# Patient Record
Sex: MALE | Race: WHITE | NOT HISPANIC OR LATINO | Employment: OTHER | URBAN - NONMETROPOLITAN AREA
[De-identification: names, ages, dates, MRNs, and addresses within clinical notes are randomized per-mention and may not be internally consistent; named-entity substitution may affect disease eponyms.]

---

## 2017-01-31 RX ORDER — BENAZEPRIL HYDROCHLORIDE 20 MG/1
TABLET ORAL
Qty: 60 TABLET | Refills: 0 | Status: SHIPPED | OUTPATIENT
Start: 2017-01-31 | End: 2017-03-02 | Stop reason: SDUPTHER

## 2017-02-07 ENCOUNTER — TRANSCRIBE ORDERS (OUTPATIENT)
Dept: LAB | Facility: OTHER | Age: 82
End: 2017-02-07

## 2017-02-07 ENCOUNTER — LAB (OUTPATIENT)
Dept: LAB | Facility: OTHER | Age: 82
End: 2017-02-07

## 2017-02-07 DIAGNOSIS — N18.9 CHRONIC KIDNEY DISEASE, UNSPECIFIED: Primary | ICD-10-CM

## 2017-02-07 DIAGNOSIS — N18.9 CHRONIC KIDNEY DISEASE, UNSPECIFIED: ICD-10-CM

## 2017-02-07 DIAGNOSIS — E87.5 HYPERKALEMIA: ICD-10-CM

## 2017-02-07 LAB
ANION GAP SERPL CALCULATED.3IONS-SCNC: 9 MMOL/L (ref 5–15)
BUN BLD-MCNC: 35 MG/DL (ref 8–25)
BUN/CREAT SERPL: 21.9 (ref 7–25)
CALCIUM SPEC-SCNC: 9.5 MG/DL (ref 8.4–10.8)
CHLORIDE SERPL-SCNC: 106 MMOL/L (ref 100–112)
CO2 SERPL-SCNC: 25 MMOL/L (ref 20–32)
CREAT BLD-MCNC: 1.6 MG/DL (ref 0.4–1.3)
GFR SERPL CREATININE-BSD FRML MDRD: 42 ML/MIN/1.73 (ref 42–98)
GLUCOSE BLD-MCNC: 120 MG/DL (ref 70–100)
POTASSIUM BLD-SCNC: 5.2 MMOL/L (ref 3.4–5.4)
SODIUM BLD-SCNC: 140 MMOL/L (ref 134–146)

## 2017-02-07 PROCEDURE — 80048 BASIC METABOLIC PNL TOTAL CA: CPT | Performed by: INTERNAL MEDICINE

## 2017-02-07 PROCEDURE — 36415 COLL VENOUS BLD VENIPUNCTURE: CPT | Performed by: INTERNAL MEDICINE

## 2017-02-28 ENCOUNTER — LAB (OUTPATIENT)
Dept: LAB | Facility: OTHER | Age: 82
End: 2017-02-28

## 2017-02-28 DIAGNOSIS — R73.9 HYPERGLYCEMIA: ICD-10-CM

## 2017-02-28 DIAGNOSIS — I10 ESSENTIAL HYPERTENSION: ICD-10-CM

## 2017-02-28 DIAGNOSIS — E78.5 HYPERLIPIDEMIA: ICD-10-CM

## 2017-02-28 LAB
ALBUMIN SERPL-MCNC: 3.6 G/DL (ref 3.2–5.5)
ALBUMIN/GLOB SERPL: 1 G/DL (ref 1–3)
ALP SERPL-CCNC: 84 U/L (ref 15–121)
ALT SERPL W P-5'-P-CCNC: 23 U/L (ref 10–60)
ANION GAP SERPL CALCULATED.3IONS-SCNC: 9 MMOL/L (ref 5–15)
ARTICHOKE IGE QN: 73 MG/DL (ref 0–129)
AST SERPL-CCNC: 27 U/L (ref 10–60)
BILIRUB SERPL-MCNC: 0.9 MG/DL (ref 0.2–1)
BUN BLD-MCNC: 23 MG/DL (ref 8–25)
BUN/CREAT SERPL: 23 (ref 7–25)
CALCIUM SPEC-SCNC: 9.3 MG/DL (ref 8.4–10.8)
CHLORIDE SERPL-SCNC: 108 MMOL/L (ref 100–112)
CO2 SERPL-SCNC: 27 MMOL/L (ref 20–32)
CREAT BLD-MCNC: 1 MG/DL (ref 0.4–1.3)
GFR SERPL CREATININE-BSD FRML MDRD: 72 ML/MIN/1.73 (ref 42–98)
GLOBULIN UR ELPH-MCNC: 3.7 GM/DL (ref 2.5–4.6)
GLUCOSE BLD-MCNC: 124 MG/DL (ref 70–100)
POTASSIUM BLD-SCNC: 3.9 MMOL/L (ref 3.4–5.4)
PROT SERPL-MCNC: 7.3 G/DL (ref 6.7–8.2)
SODIUM BLD-SCNC: 144 MMOL/L (ref 134–146)

## 2017-02-28 PROCEDURE — 36415 COLL VENOUS BLD VENIPUNCTURE: CPT | Performed by: FAMILY MEDICINE

## 2017-02-28 PROCEDURE — 83036 HEMOGLOBIN GLYCOSYLATED A1C: CPT | Performed by: FAMILY MEDICINE

## 2017-02-28 PROCEDURE — 83721 ASSAY OF BLOOD LIPOPROTEIN: CPT | Performed by: FAMILY MEDICINE

## 2017-02-28 PROCEDURE — 80053 COMPREHEN METABOLIC PANEL: CPT | Performed by: FAMILY MEDICINE

## 2017-03-01 LAB — HBA1C MFR BLD: 5.59 % (ref 4–5.6)

## 2017-03-02 RX ORDER — BENAZEPRIL HYDROCHLORIDE 20 MG/1
TABLET ORAL
Qty: 60 TABLET | Refills: 0 | Status: SHIPPED | OUTPATIENT
Start: 2017-03-02 | End: 2017-04-02 | Stop reason: SDUPTHER

## 2017-03-09 ENCOUNTER — OFFICE VISIT (OUTPATIENT)
Dept: FAMILY MEDICINE CLINIC | Facility: CLINIC | Age: 82
End: 2017-03-09

## 2017-03-09 VITALS
RESPIRATION RATE: 17 BRPM | TEMPERATURE: 98.2 F | WEIGHT: 183 LBS | OXYGEN SATURATION: 98 % | HEART RATE: 84 BPM | DIASTOLIC BLOOD PRESSURE: 82 MMHG | HEIGHT: 72 IN | BODY MASS INDEX: 24.79 KG/M2 | SYSTOLIC BLOOD PRESSURE: 144 MMHG

## 2017-03-09 DIAGNOSIS — K21.9 GASTROESOPHAGEAL REFLUX DISEASE WITHOUT ESOPHAGITIS: ICD-10-CM

## 2017-03-09 DIAGNOSIS — R73.9 HYPERGLYCEMIA: ICD-10-CM

## 2017-03-09 DIAGNOSIS — D64.9 ANEMIA, UNSPECIFIED TYPE: ICD-10-CM

## 2017-03-09 DIAGNOSIS — I25.10 CORONARY ARTERY DISEASE INVOLVING NATIVE CORONARY ARTERY OF NATIVE HEART WITHOUT ANGINA PECTORIS: ICD-10-CM

## 2017-03-09 DIAGNOSIS — I10 ESSENTIAL HYPERTENSION: Primary | ICD-10-CM

## 2017-03-09 DIAGNOSIS — E78.5 HYPERLIPIDEMIA, UNSPECIFIED HYPERLIPIDEMIA TYPE: ICD-10-CM

## 2017-03-09 DIAGNOSIS — Z12.5 ENCOUNTER FOR SCREENING FOR MALIGNANT NEOPLASM OF PROSTATE: ICD-10-CM

## 2017-03-09 PROCEDURE — 99214 OFFICE O/P EST MOD 30 MIN: CPT | Performed by: FAMILY MEDICINE

## 2017-03-09 NOTE — PROGRESS NOTES
Subjective   Barak Marcial is a 81 y.o. male who presents to the office for follow-up and review of labs.     History of Present Illness   Patient with history of hypertension, hyperlipidemia, and CAD is in today for reevaluation and to review labs.  Patient is doing well with no acute complaints voiced.  He denies chest pain PND orthopnea.  Denies any neurological symptoms  The following portions of the patient's history were reviewed and updated as appropriate: allergies, current medications, past family history, past medical history, past social history, past surgical history and problem list.    Review of Systems   Constitutional: Positive for fatigue.   HENT: Negative.    Eyes: Negative.    Respiratory: Positive for cough and shortness of breath.    Cardiovascular: Negative.    Gastrointestinal: Negative.    Endocrine: Negative.    Genitourinary: Negative.    Musculoskeletal: Positive for arthralgias.   Skin: Negative.    Allergic/Immunologic: Negative.    Neurological: Negative.    Hematological: Negative.    Psychiatric/Behavioral: Negative.    All other systems reviewed and are negative.      Objective   Physical Exam   Constitutional: He is oriented to person, place, and time. He appears well-developed and well-nourished.   HENT:   Head: Normocephalic and atraumatic.   Right Ear: External ear normal.   Left Ear: External ear normal.   Nose: Nose normal.   Mouth/Throat: Oropharynx is clear and moist.   Eyes: Conjunctivae and EOM are normal. Pupils are equal, round, and reactive to light.   Neck: Normal range of motion. Neck supple.   Cardiovascular: Normal rate, regular rhythm, normal heart sounds and intact distal pulses.  Exam reveals no gallop and no friction rub.    No murmur heard.  Pulmonary/Chest: Effort normal and breath sounds normal. He has no wheezes. He has no rales.   Abdominal: Soft. Bowel sounds are normal. He exhibits no mass. There is no tenderness. There is no rebound and no  guarding.   Musculoskeletal: Normal range of motion.   Neurological: He is alert and oriented to person, place, and time. He has normal reflexes. No cranial nerve deficit. He exhibits normal muscle tone.   Skin: Skin is warm and dry. No rash noted.   Psychiatric: He has a normal mood and affect. His behavior is normal. Judgment and thought content normal.   Nursing note and vitals reviewed.      Assessment/Plan   Barak was seen today for hypertension and hyperlipidemia.    Diagnoses and all orders for this visit:    Essential hypertension  -     TSH; Future  -     PSA Screen; Future    Hyperlipidemia, unspecified hyperlipidemia type  -     Comprehensive Metabolic Panel; Future  -     Lipid Panel; Future    Coronary artery disease involving native coronary artery of native heart without angina pectoris    Anemia, unspecified type  -     CBC & Differential; Future    Gastroesophageal reflux disease without esophagitis    Hyperglycemia  -     Hemoglobin A1c; Future    Encounter for screening for malignant neoplasm of prostate   -     PSA Screen; Future      Continue current therapies.  Dietary and lifestyle modifications discussed     Labs are reviewed with patient.    Lab on 02/28/2017   Component Date Value Ref Range Status   • LDL Cholesterol  02/28/2017 73  0 - 129 mg/dL Final   • Hemoglobin A1C 02/28/2017 5.59  4 - 5.6 % Final   • Glucose 02/28/2017 124* 70 - 100 mg/dL Final   • BUN 02/28/2017 23  8 - 25 mg/dL Final   • Creatinine 02/28/2017 1.00  0.40 - 1.30 mg/dL Final   • Sodium 02/28/2017 144  134 - 146 mmol/L Final   • Potassium 02/28/2017 3.9  3.4 - 5.4 mmol/L Final   • Chloride 02/28/2017 108  100 - 112 mmol/L Final   • CO2 02/28/2017 27.0  20.0 - 32.0 mmol/L Final   • Calcium 02/28/2017 9.3  8.4 - 10.8 mg/dL Final   • Total Protein 02/28/2017 7.3  6.7 - 8.2 g/dL Final   • Albumin 02/28/2017 3.60  3.20 - 5.50 g/dL Final   • ALT (SGPT) 02/28/2017 23  10 - 60 U/L Final   • AST (SGOT) 02/28/2017 27  10 - 60  U/L Final   • Alkaline Phosphatase 02/28/2017 84  15 - 121 U/L Final   • Total Bilirubin 02/28/2017 0.9  0.2 - 1.0 mg/dL Final   • eGFR Non African Amer 02/28/2017 72  42 - 98 mL/min/1.73 Final   • Globulin 02/28/2017 3.7  2.5 - 4.6 gm/dL Final   • A/G Ratio 02/28/2017 1.0  1.0 - 3.0 g/dL Final   • BUN/Creatinine Ratio 02/28/2017 23.0  7.0 - 25.0 Final   • Anion Gap 02/28/2017 9.0  5.0 - 15.0 mmol/L Final   Lab on 02/07/2017   Component Date Value Ref Range Status   • Glucose 02/07/2017 120* 70 - 100 mg/dL Final   • BUN 02/07/2017 35* 8 - 25 mg/dL Final   • Creatinine 02/07/2017 1.60* 0.40 - 1.30 mg/dL Final   • Sodium 02/07/2017 140  134 - 146 mmol/L Final   • Potassium 02/07/2017 5.2  3.4 - 5.4 mmol/L Final   • Chloride 02/07/2017 106  100 - 112 mmol/L Final   • CO2 02/07/2017 25.0  20.0 - 32.0 mmol/L Final   • Calcium 02/07/2017 9.5  8.4 - 10.8 mg/dL Final   • eGFR Non African Amer 02/07/2017 42  42 - 98 mL/min/1.73 Final   • BUN/Creatinine Ratio 02/07/2017 21.9  7.0 - 25.0 Final   • Anion Gap 02/07/2017 9.0  5.0 - 15.0 mmol/L Final   ]

## 2017-03-09 NOTE — PATIENT INSTRUCTIONS
Steps to Quit Smoking   Smoking tobacco can be harmful to your health and can affect almost every organ in your body. Smoking puts you, and those around you, at risk for developing many serious chronic diseases. Quitting smoking is difficult, but it is one of the best things that you can do for your health. It is never too late to quit.  WHAT ARE THE BENEFITS OF QUITTING SMOKING?  When you quit smoking, you lower your risk of developing serious diseases and conditions, such as:  · Lung cancer or lung disease, such as COPD.  · Heart disease.  · Stroke.  · Heart attack.  · Infertility.  · Osteoporosis and bone fractures.  Additionally, symptoms such as coughing, wheezing, and shortness of breath may get better when you quit. You may also find that you get sick less often because your body is stronger at fighting off colds and infections. If you are pregnant, quitting smoking can help to reduce your chances of having a baby of low birth weight.  HOW DO I GET READY TO QUIT?  When you decide to quit smoking, create a plan to make sure that you are successful. Before you quit:  · Pick a date to quit. Set a date within the next two weeks to give you time to prepare.  · Write down the reasons why you are quitting. Keep this list in places where you will see it often, such as on your bathroom mirror or in your car or wallet.  · Identify the people, places, things, and activities that make you want to smoke (triggers) and avoid them. Make sure to take these actions:    Throw away all cigarettes at home, at work, and in your car.    Throw away smoking accessories, such as ashtrays and lighters.    Clean your car and make sure to empty the ashtray.    Clean your home, including curtains and carpets.  · Tell your family, friends, and coworkers that you are quitting. Support from your loved ones can make quitting easier.  · Talk with your health care provider about your options for quitting smoking.  · Find out what treatment  "options are covered by your health insurance.  WHAT STRATEGIES CAN I USE TO QUIT SMOKING?   Talk with your healthcare provider about different strategies to quit smoking. Some strategies include:  · Quitting smoking altogether instead of gradually lessening how much you smoke over a period of time. Research shows that quitting \"cold turkey\" is more successful than gradually quitting.  · Attending in-person counseling to help you build problem-solving skills. You are more likely to have success in quitting if you attend several counseling sessions. Even short sessions of 10 minutes can be effective.  · Finding resources and support systems that can help you to quit smoking and remain smoke-free after you quit. These resources are most helpful when you use them often. They can include:    Online chats with a counselor.    Telephone quitlines.    Printed self-help materials.    Support groups or group counseling.    Text messaging programs.    Mobile phone applications.  · Taking medicines to help you quit smoking. (If you are pregnant or breastfeeding, talk with your health care provider first.) Some medicines contain nicotine and some do not. Both types of medicines help with cravings, but the medicines that include nicotine help to relieve withdrawal symptoms. Your health care provider may recommend:    Nicotine patches, gum, or lozenges.    Nicotine inhalers or sprays.    Non-nicotine medicine that is taken by mouth.  Talk with your health care provider about combining strategies, such as taking medicines while you are also receiving in-person counseling. Using these two strategies together makes you more likely to succeed in quitting than if you used either strategy on its own.  If you are pregnant or breastfeeding, talk with your health care provider about finding counseling or other support strategies to quit smoking. Do not take medicine to help you quit smoking unless told to do so by your health care " provider.  WHAT THINGS CAN I DO TO MAKE IT EASIER TO QUIT?  Quitting smoking might feel overwhelming at first, but there is a lot that you can do to make it easier. Take these important actions:  · Reach out to your family and friends and ask that they support and encourage you during this time. Call telephone quitlines, reach out to support groups, or work with a counselor for support.  · Ask people who smoke to avoid smoking around you.  · Avoid places that trigger you to smoke, such as bars, parties, or smoke-break areas at work.  · Spend time around people who do not smoke.  · Lessen stress in your life, because stress can be a smoking trigger for some people. To lessen stress, try:    Exercising regularly.    Deep-breathing exercises.    Yoga.    Meditating.    Performing a body scan. This involves closing your eyes, scanning your body from head to toe, and noticing which parts of your body are particularly tense. Purposefully relax the muscles in those areas.  · Download or purchase mobile phone or tablet apps (applications) that can help you stick to your quit plan by providing reminders, tips, and encouragement. There are many free apps, such as QuitGuide from the CDC (Centers for Disease Control and Prevention). You can find other support for quitting smoking (smoking cessation) through smokefree.gov and other websites.  HOW WILL I FEEL WHEN I QUIT SMOKING?  Within the first 24 hours of quitting smoking, you may start to feel some withdrawal symptoms. These symptoms are usually most noticeable 2-3 days after quitting, but they usually do not last beyond 2-3 weeks. Changes or symptoms that you might experience include:  · Mood swings.  · Restlessness, anxiety, or irritation.  · Difficulty concentrating.  · Dizziness.  · Strong cravings for sugary foods in addition to nicotine.  · Mild weight gain.  · Constipation.  · Nausea.  · Coughing or a sore throat.  · Changes in how your medicines work in your  body.  · A depressed mood.  · Difficulty sleeping (insomnia).  After the first 2-3 weeks of quitting, you may start to notice more positive results, such as:  · Improved sense of smell and taste.  · Decreased coughing and sore throat.  · Slower heart rate.  · Lower blood pressure.  · Clearer skin.  · The ability to breathe more easily.  · Fewer sick days.  Quitting smoking is very challenging for most people. Do not get discouraged if you are not successful the first time. Some people need to make many attempts to quit before they achieve long-term success. Do your best to stick to your quit plan, and talk with your health care provider if you have any questions or concerns.     This information is not intended to replace advice given to you by your health care provider. Make sure you discuss any questions you have with your health care provider.     Document Released: 12/12/2002 Document Revised: 05/03/2016 Document Reviewed: 05/03/2016  [employer] Interactive Patient Education ©2016 [employer]

## 2017-04-03 RX ORDER — BENAZEPRIL HYDROCHLORIDE 20 MG/1
TABLET ORAL
Qty: 60 TABLET | Refills: 0 | Status: SHIPPED | OUTPATIENT
Start: 2017-04-03 | End: 2017-05-03 | Stop reason: SDUPTHER

## 2017-05-03 RX ORDER — BENAZEPRIL HYDROCHLORIDE 20 MG/1
TABLET ORAL
Qty: 60 TABLET | Refills: 5 | Status: SHIPPED | OUTPATIENT
Start: 2017-05-03 | End: 2017-11-06 | Stop reason: SDUPTHER

## 2017-05-09 RX ORDER — OMEPRAZOLE 20 MG/1
CAPSULE, DELAYED RELEASE ORAL
Qty: 30 CAPSULE | Refills: 0 | Status: SHIPPED | OUTPATIENT
Start: 2017-05-09 | End: 2017-06-05 | Stop reason: SDUPTHER

## 2017-06-05 RX ORDER — OMEPRAZOLE 20 MG/1
CAPSULE, DELAYED RELEASE ORAL
Qty: 30 CAPSULE | Refills: 0 | Status: SHIPPED | OUTPATIENT
Start: 2017-06-05 | End: 2017-07-10 | Stop reason: SDUPTHER

## 2017-06-11 RX ORDER — TAMSULOSIN HYDROCHLORIDE 0.4 MG/1
1 CAPSULE ORAL DAILY
Qty: 30 CAPSULE | Refills: 5 | Status: SHIPPED | OUTPATIENT
Start: 2017-06-11 | End: 2018-02-08 | Stop reason: SDUPTHER

## 2017-06-22 ENCOUNTER — TRANSCRIBE ORDERS (OUTPATIENT)
Dept: LAB | Facility: OTHER | Age: 82
End: 2017-06-22

## 2017-06-22 ENCOUNTER — LAB (OUTPATIENT)
Dept: LAB | Facility: OTHER | Age: 82
End: 2017-06-22

## 2017-06-22 DIAGNOSIS — Z79.899 DRUG THERAPY: Primary | ICD-10-CM

## 2017-06-22 DIAGNOSIS — Z79.899 DRUG THERAPY: ICD-10-CM

## 2017-06-22 LAB
ANION GAP SERPL CALCULATED.3IONS-SCNC: 11 MMOL/L (ref 5–15)
BUN BLD-MCNC: 33 MG/DL (ref 8–25)
BUN/CREAT SERPL: 22 (ref 7–25)
CALCIUM SPEC-SCNC: 9.5 MG/DL (ref 8.4–10.8)
CHLORIDE SERPL-SCNC: 101 MMOL/L (ref 100–112)
CO2 SERPL-SCNC: 26 MMOL/L (ref 20–32)
CREAT BLD-MCNC: 1.5 MG/DL (ref 0.4–1.3)
GFR SERPL CREATININE-BSD FRML MDRD: 45 ML/MIN/1.73 (ref 42–98)
GLUCOSE BLD-MCNC: 111 MG/DL (ref 70–100)
POTASSIUM BLD-SCNC: 4.9 MMOL/L (ref 3.4–5.4)
SODIUM BLD-SCNC: 138 MMOL/L (ref 134–146)

## 2017-06-22 PROCEDURE — 80048 BASIC METABOLIC PNL TOTAL CA: CPT | Performed by: INTERNAL MEDICINE

## 2017-06-22 PROCEDURE — 36415 COLL VENOUS BLD VENIPUNCTURE: CPT | Performed by: INTERNAL MEDICINE

## 2017-07-10 RX ORDER — OMEPRAZOLE 20 MG/1
CAPSULE, DELAYED RELEASE ORAL
Qty: 30 CAPSULE | Refills: 0 | Status: SHIPPED | OUTPATIENT
Start: 2017-07-10 | End: 2017-08-10 | Stop reason: SDUPTHER

## 2017-08-11 RX ORDER — OMEPRAZOLE 20 MG/1
CAPSULE, DELAYED RELEASE ORAL
Qty: 30 CAPSULE | Refills: 0 | Status: SHIPPED | OUTPATIENT
Start: 2017-08-11 | End: 2017-09-08 | Stop reason: SDUPTHER

## 2017-09-08 RX ORDER — OMEPRAZOLE 20 MG/1
CAPSULE, DELAYED RELEASE ORAL
Qty: 30 CAPSULE | Refills: 0 | Status: SHIPPED | OUTPATIENT
Start: 2017-09-08 | End: 2017-10-09 | Stop reason: SDUPTHER

## 2017-09-20 ENCOUNTER — OFFICE VISIT (OUTPATIENT)
Dept: FAMILY MEDICINE CLINIC | Facility: CLINIC | Age: 82
End: 2017-09-20

## 2017-09-20 VITALS
WEIGHT: 183 LBS | RESPIRATION RATE: 16 BRPM | OXYGEN SATURATION: 95 % | HEART RATE: 72 BPM | SYSTOLIC BLOOD PRESSURE: 122 MMHG | DIASTOLIC BLOOD PRESSURE: 64 MMHG | BODY MASS INDEX: 24.79 KG/M2 | HEIGHT: 72 IN | TEMPERATURE: 98.2 F

## 2017-09-20 DIAGNOSIS — I25.10 CORONARY ARTERY DISEASE INVOLVING NATIVE CORONARY ARTERY OF NATIVE HEART WITHOUT ANGINA PECTORIS: ICD-10-CM

## 2017-09-20 DIAGNOSIS — D64.9 ANEMIA, UNSPECIFIED TYPE: ICD-10-CM

## 2017-09-20 DIAGNOSIS — R73.9 HYPERGLYCEMIA: ICD-10-CM

## 2017-09-20 DIAGNOSIS — I10 ESSENTIAL HYPERTENSION: Primary | ICD-10-CM

## 2017-09-20 DIAGNOSIS — E78.5 HYPERLIPIDEMIA, UNSPECIFIED HYPERLIPIDEMIA TYPE: ICD-10-CM

## 2017-09-20 PROCEDURE — 99213 OFFICE O/P EST LOW 20 MIN: CPT | Performed by: FAMILY MEDICINE

## 2017-09-20 RX ORDER — HYDROXYZINE HYDROCHLORIDE 25 MG/1
25 TABLET, FILM COATED ORAL 3 TIMES DAILY PRN
Qty: 90 TABLET | Refills: 2 | Status: SHIPPED | OUTPATIENT
Start: 2017-09-20 | End: 2018-01-30

## 2017-09-20 NOTE — PROGRESS NOTES
Subjective   Barak Marcial is a 81 y.o. male who presents to the office for follow-up and review of labs.     History of Present Illness   Patient with history of hypertension, hyperlipidemia, CAD, and hyperglycemia is in today for reevaluation.  Patient recently saw his cardiologist and had labs done through their facility.  They were supposed to send a copy to us although we've not received this.  His only new complaint is some itching which she is requesting some medication for.  He denies rash.  He denies chest pain, PND, orthopnea.  He has had no neurological symptoms.    The following portions of the patient's history were reviewed and updated as appropriate: allergies, current medications, past family history, past medical history, past social history, past surgical history and problem list.    Review of Systems   Constitutional: Positive for fatigue.   HENT: Negative.    Eyes: Negative.    Respiratory: Positive for shortness of breath.    Cardiovascular: Negative.    Gastrointestinal: Negative.    Endocrine: Negative.    Genitourinary: Negative.    Musculoskeletal: Positive for arthralgias.   Skin: Negative.    Allergic/Immunologic: Negative.    Neurological: Negative.    Hematological: Negative.    Psychiatric/Behavioral: Negative.    All other systems reviewed and are negative.      Objective   Physical Exam   Constitutional: He is oriented to person, place, and time. He appears well-developed and well-nourished.   HENT:   Head: Normocephalic and atraumatic.   Right Ear: External ear normal.   Left Ear: External ear normal.   Nose: Nose normal.   Mouth/Throat: Oropharynx is clear and moist.   Eyes: Conjunctivae and EOM are normal. Pupils are equal, round, and reactive to light.   Neck: Normal range of motion. Neck supple.   Well-healed surgical incision left neck   Cardiovascular: Normal rate, regular rhythm, normal heart sounds and intact distal pulses.  Exam reveals no gallop and no friction rub.     No murmur heard.  Pulmonary/Chest: Effort normal. He has wheezes. He has no rales.   Abdominal: Soft. Bowel sounds are normal. He exhibits no mass. There is no tenderness. There is no rebound and no guarding.   Musculoskeletal: Normal range of motion.   Neurological: He is alert and oriented to person, place, and time. He has normal reflexes. No cranial nerve deficit. He exhibits normal muscle tone.   Skin: Skin is warm and dry. No rash noted.   Psychiatric: He has a normal mood and affect. His behavior is normal. Judgment and thought content normal.   Nursing note and vitals reviewed.      Assessment/Plan   Barak was seen today for hypertension, hyperlipidemia and anemia.    Diagnoses and all orders for this visit:    Essential hypertension    Hyperlipidemia, unspecified hyperlipidemia type  -     LDL Cholesterol, Direct; Future    Coronary artery disease involving native coronary artery of native heart without angina pectoris    Anemia, unspecified type    Hyperglycemia  -     Comprehensive Metabolic Panel; Future  -     LDL Cholesterol, Direct; Future  -     Hemoglobin A1c; Future    Other orders  -     hydrOXYzine (ATARAX) 25 MG tablet; Take 1 tablet by mouth 3 (Three) Times a Day As Needed for Itching.         Labs are reviewed with patient.    PHQ-2/PHQ-9 Depression Screening 9/20/2017   Little interest or pleasure in doing things 0   Feeling down, depressed, or hopeless 0   Trouble falling or staying asleep, or sleeping too much -   Feeling tired or having little energy -   Poor appetite or overeating -   Feeling bad about yourself - or that you are a failure or have let yourself or your family down -   Trouble concentrating on things, such as reading the newspaper or watching television -   Moving or speaking so slowly that other people could have noticed. Or the opposite - being so fidgety or restless that you have been moving around a lot more than usual -   Thoughts that you would be better off dead, or  of hurting yourself in some way -   Total Score 0   If you checked off any problems, how difficult have these problems made it for you to do your work, take care of things at home, or get along with other people? -         Lab on 06/22/2017   Component Date Value Ref Range Status   • Glucose 06/22/2017 111* 70 - 100 mg/dL Final   • BUN 06/22/2017 33* 8 - 25 mg/dL Final   • Creatinine 06/22/2017 1.50* 0.40 - 1.30 mg/dL Final   • Sodium 06/22/2017 138  134 - 146 mmol/L Final   • Potassium 06/22/2017 4.9  3.4 - 5.4 mmol/L Final   • Chloride 06/22/2017 101  100 - 112 mmol/L Final   • CO2 06/22/2017 26.0  20.0 - 32.0 mmol/L Final   • Calcium 06/22/2017 9.5  8.4 - 10.8 mg/dL Final   • eGFR Non African Amer 06/22/2017 45  42 - 98 mL/min/1.73 Final   • BUN/Creatinine Ratio 06/22/2017 22.0  7.0 - 25.0 Final   • Anion Gap 06/22/2017 11.0  5.0 - 15.0 mmol/L Final   ]

## 2017-10-09 RX ORDER — OMEPRAZOLE 20 MG/1
CAPSULE, DELAYED RELEASE ORAL
Qty: 90 CAPSULE | Refills: 2 | Status: SHIPPED | OUTPATIENT
Start: 2017-10-09 | End: 2018-07-18 | Stop reason: SDUPTHER

## 2017-11-06 RX ORDER — BENAZEPRIL HYDROCHLORIDE 20 MG/1
TABLET ORAL
Qty: 60 TABLET | Refills: 5 | Status: SHIPPED | OUTPATIENT
Start: 2017-11-06 | End: 2018-02-21 | Stop reason: SDUPTHER

## 2018-01-30 ENCOUNTER — OFFICE VISIT (OUTPATIENT)
Dept: FAMILY MEDICINE CLINIC | Facility: CLINIC | Age: 83
End: 2018-01-30

## 2018-01-30 VITALS
SYSTOLIC BLOOD PRESSURE: 130 MMHG | BODY MASS INDEX: 24.54 KG/M2 | OXYGEN SATURATION: 97 % | HEIGHT: 72 IN | RESPIRATION RATE: 16 BRPM | DIASTOLIC BLOOD PRESSURE: 70 MMHG | TEMPERATURE: 97.5 F | WEIGHT: 181.2 LBS | HEART RATE: 90 BPM

## 2018-01-30 DIAGNOSIS — L40.9 PSORIASIS: Primary | ICD-10-CM

## 2018-01-30 PROCEDURE — 99213 OFFICE O/P EST LOW 20 MIN: CPT | Performed by: FAMILY MEDICINE

## 2018-01-30 RX ORDER — BETAMETHASONE DIPROPIONATE 0.5 MG/G
CREAM TOPICAL
Qty: 45 G | Refills: 2 | Status: SHIPPED | OUTPATIENT
Start: 2018-01-30 | End: 2018-03-21

## 2018-01-30 RX ORDER — PREDNISONE 20 MG/1
20 TABLET ORAL 2 TIMES DAILY
Qty: 14 TABLET | Refills: 0 | Status: SHIPPED | OUTPATIENT
Start: 2018-01-30 | End: 2018-03-21

## 2018-01-30 NOTE — PROGRESS NOTES
Subjective   Barak Marcial is a 82 y.o. male.   Chief Complaint   Patient presents with   • Rash     arms, legs, back. onset three months has been trying OTC meds and nothing seems to helping, patient states it also comes with an itch       Rash   This is a chronic problem. The current episode started more than 1 month ago. The problem has been gradually worsening since onset. The affected locations include the left arm, right arm, left lower leg, right lowerleg and back. The rash is characterized by burning, itchiness, scaling and redness. He was exposed to nothing. Associated symptoms include fatigue and shortness of breath. Past treatments include antibiotic cream, antihistamine and anti-itch cream. The treatment provided no relief.        The following portions of the patient's history were reviewed and updated as appropriate: allergies, current medications, past family history, past medical history, past social history, past surgical history and problem list.    Review of Systems   Constitutional: Positive for fatigue.   HENT: Negative.    Eyes: Negative.    Respiratory: Positive for shortness of breath.    Cardiovascular: Negative.    Gastrointestinal: Negative.    Endocrine: Negative.    Genitourinary: Negative.    Musculoskeletal: Positive for arthralgias.   Skin: Positive for rash.   Allergic/Immunologic: Negative.    Neurological: Negative.    Hematological: Negative.    Psychiatric/Behavioral: Negative.    All other systems reviewed and are negative.      Objective   Physical Exam   Constitutional: He is oriented to person, place, and time. He appears well-developed and well-nourished.   HENT:   Head: Normocephalic and atraumatic.   Right Ear: External ear normal.   Left Ear: External ear normal.   Nose: Nose normal.   Mouth/Throat: Oropharynx is clear and moist.   Eyes: Conjunctivae and EOM are normal. Pupils are equal, round, and reactive to light.   Neck: Normal range of motion. Neck supple.    Well-healed surgical incision left neck   Cardiovascular: Normal rate, regular rhythm, normal heart sounds and intact distal pulses.  Exam reveals no gallop and no friction rub.    No murmur heard.  Pulmonary/Chest: Effort normal. He has wheezes. He has no rales.   Abdominal: Soft. Bowel sounds are normal. He exhibits no mass. There is no tenderness. There is no rebound and no guarding.   Musculoskeletal: Normal range of motion.   Neurological: He is alert and oriented to person, place, and time. He has normal reflexes. No cranial nerve deficit. He exhibits normal muscle tone.   Skin: Skin is warm and dry. Rash noted. Rash is papular.        Psychiatric: He has a normal mood and affect. His behavior is normal. Judgment and thought content normal.   Nursing note and vitals reviewed.      Assessment/Plan   Barak was seen today for rash.    Diagnoses and all orders for this visit:    Psoriasis    Other orders  -     predniSONE (DELTASONE) 20 MG tablet; Take 1 tablet by mouth 2 (Two) Times a Day.  -     betamethasone dipropionate (DIPROLENE) 0.05 % cream; Apply  topically 2 (Two) Times a Day.

## 2018-02-08 RX ORDER — TAMSULOSIN HYDROCHLORIDE 0.4 MG/1
CAPSULE ORAL
Qty: 30 CAPSULE | Refills: 5 | Status: SHIPPED | OUTPATIENT
Start: 2018-02-08 | End: 2018-08-21 | Stop reason: SDUPTHER

## 2018-02-21 RX ORDER — BENAZEPRIL HYDROCHLORIDE 20 MG/1
40 TABLET ORAL DAILY
Qty: 180 TABLET | Refills: 2 | Status: SHIPPED | OUTPATIENT
Start: 2018-02-21 | End: 2018-03-28 | Stop reason: SDUPTHER

## 2018-03-21 ENCOUNTER — OFFICE VISIT (OUTPATIENT)
Dept: FAMILY MEDICINE CLINIC | Facility: CLINIC | Age: 83
End: 2018-03-21

## 2018-03-21 VITALS
BODY MASS INDEX: 24.46 KG/M2 | HEIGHT: 72 IN | OXYGEN SATURATION: 95 % | DIASTOLIC BLOOD PRESSURE: 70 MMHG | WEIGHT: 180.6 LBS | RESPIRATION RATE: 14 BRPM | TEMPERATURE: 97.3 F | SYSTOLIC BLOOD PRESSURE: 126 MMHG | HEART RATE: 102 BPM

## 2018-03-21 DIAGNOSIS — L20.84 INTRINSIC ECZEMA: Primary | ICD-10-CM

## 2018-03-21 PROCEDURE — 99213 OFFICE O/P EST LOW 20 MIN: CPT | Performed by: FAMILY MEDICINE

## 2018-03-21 RX ORDER — PREDNISONE 20 MG/1
20 TABLET ORAL
Qty: 21 TABLET | Refills: 0 | Status: SHIPPED | OUTPATIENT
Start: 2018-03-21

## 2018-03-21 NOTE — PROGRESS NOTES
Subjective   Barak Marcial is a 82 y.o. male.   Chief Complaint   Patient presents with   • Rash     all over with itch. onset a couple months       Rash   This is a chronic problem. The current episode started more than 1 year ago. The problem is unchanged. The rash is diffuse. The rash is characterized by itchiness, redness, scaling and swelling. He was exposed to nothing. Associated symptoms include fatigue and shortness of breath. Past treatments include topical steroids. The treatment provided no relief.      Patient didn't receive oral steroids from last visit.    The following portions of the patient's history were reviewed and updated as appropriate: allergies, current medications, past family history, past medical history, past social history, past surgical history and problem list.    Review of Systems   Constitutional: Positive for fatigue.   HENT: Negative.    Eyes: Negative.    Respiratory: Positive for shortness of breath.    Cardiovascular: Negative.    Gastrointestinal: Negative.    Endocrine: Negative.    Genitourinary: Negative.    Musculoskeletal: Positive for arthralgias.   Skin: Positive for rash.   Allergic/Immunologic: Negative.    Neurological: Negative.    Hematological: Negative.    Psychiatric/Behavioral: Negative.    All other systems reviewed and are negative.      Objective   Physical Exam   Constitutional: He is oriented to person, place, and time. He appears well-developed and well-nourished.   HENT:   Head: Normocephalic and atraumatic.   Right Ear: External ear normal.   Left Ear: External ear normal.   Nose: Nose normal.   Mouth/Throat: Oropharynx is clear and moist.   Eyes: Conjunctivae and EOM are normal. Pupils are equal, round, and reactive to light.   Neck: Normal range of motion. Neck supple.   Well-healed surgical incision left neck   Cardiovascular: Normal rate, regular rhythm, normal heart sounds and intact distal pulses.  Exam reveals no gallop and no friction rub.     No murmur heard.  Pulmonary/Chest: Effort normal. He has wheezes. He has no rales.   Abdominal: Soft. Bowel sounds are normal. He exhibits no mass. There is no tenderness. There is no rebound and no guarding.   Musculoskeletal: Normal range of motion.   Neurological: He is alert and oriented to person, place, and time. He has normal reflexes. No cranial nerve deficit. He exhibits normal muscle tone.   Skin: Skin is warm and dry. Rash noted. Rash is papular.        Psychiatric: He has a normal mood and affect. His behavior is normal. Judgment and thought content normal.   Nursing note and vitals reviewed.      Assessment/Plan   Barak was seen today for rash.    Diagnoses and all orders for this visit:    Intrinsic eczema    Other orders  -     predniSONE (DELTASONE) 20 MG tablet; Take 1 tablet by mouth 3 (Three) Times a Day With Meals.        If no better refer back to dermatology.

## 2018-03-28 RX ORDER — BENAZEPRIL HYDROCHLORIDE 20 MG/1
40 TABLET ORAL DAILY
Qty: 180 TABLET | Refills: 2 | Status: SHIPPED | OUTPATIENT
Start: 2018-03-28

## 2018-04-09 DIAGNOSIS — L20.84 INTRINSIC ECZEMA: Primary | ICD-10-CM

## 2018-07-18 RX ORDER — OMEPRAZOLE 20 MG/1
CAPSULE, DELAYED RELEASE ORAL
Qty: 90 CAPSULE | Refills: 2 | Status: SHIPPED | OUTPATIENT
Start: 2018-07-18

## 2018-08-22 RX ORDER — TAMSULOSIN HYDROCHLORIDE 0.4 MG/1
CAPSULE ORAL
Qty: 30 CAPSULE | Refills: 5 | Status: SHIPPED | OUTPATIENT
Start: 2018-08-22

## 2019-06-18 ENCOUNTER — APPOINTMENT (OUTPATIENT)
Dept: ONCOLOGY | Facility: CLINIC | Age: 84
End: 2019-06-18

## 2019-06-18 ENCOUNTER — APPOINTMENT (OUTPATIENT)
Dept: ONCOLOGY | Facility: HOSPITAL | Age: 84
End: 2019-06-18

## 2020-06-26 DIAGNOSIS — I73.9 PVD (PERIPHERAL VASCULAR DISEASE) (HCC): Primary | ICD-10-CM
